# Patient Record
Sex: MALE
[De-identification: names, ages, dates, MRNs, and addresses within clinical notes are randomized per-mention and may not be internally consistent; named-entity substitution may affect disease eponyms.]

---

## 2020-02-04 ENCOUNTER — HOSPITAL ENCOUNTER (OUTPATIENT)
Dept: HOSPITAL 88 - OR | Age: 30
Discharge: HOME | End: 2020-02-04
Attending: SPECIALIST
Payer: COMMERCIAL

## 2020-02-04 VITALS — DIASTOLIC BLOOD PRESSURE: 83 MMHG | SYSTOLIC BLOOD PRESSURE: 135 MMHG

## 2020-02-04 DIAGNOSIS — M23.612: Primary | ICD-10-CM

## 2020-02-04 DIAGNOSIS — S83.282A: ICD-10-CM

## 2020-02-04 DIAGNOSIS — S83.232A: ICD-10-CM
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
## 2020-02-04 DIAGNOSIS — F17.210: ICD-10-CM

## 2020-02-04 DIAGNOSIS — M67.52: ICD-10-CM
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
## 2020-02-04 NOTE — OPERATIVE REPORT
DATE OF PROCEDURE:  02/04/2020

 

SURGEON:  Jerald Mercado MD

 

ASSISTANT:  Husam Mcguire, certified PA.

 

PREOPERATIVE DIAGNOSES:  Left knee anterior cruciate ligament tear, medial meniscal tear.

 

POSTOPERATIVE DIAGNOSES:  Left knee anterior cruciate ligament tear, lateral meniscal

tear. 

 

PROCEDURES:  Left knee arthroscopy, partial lateral meniscectomy, ACL reconstruction.

 

INDICATION:  The patient is a 30-year-old gentleman, who injured his knee playing

soccer.  Clinic exam and MRI findings were consistent with a torn ACL.  He also appears

to have either a torn medial or lateral meniscus.  The findings and options have been

discussed with the patient.  He would like to have surgical reconstruction.  The risks

and benefits have been reviewed.  A lengthy recovery has been discussed.  The patient

states he understands and wishes to proceed. 

 

PROCEDURE IN DETAIL:  The patient was brought to the operating room and placed under

general anesthetic.  He received prophylactic antibiotics and a regional block in the

holding area.  His left lower extremity was prepped and draped in a sterile manner.  A

preoperative time-out was performed.  The extremity had been exsanguinated and a

proximal tourniquet was inflated to 300 mmHg.  Standard arthroscopy portals were

established.  The knee was insufflated with sterile saline and systematically inspected.

 The suprapatellar pouch was unremarkable.  The patellofemoral groove was normal.  The

medial lateral gutters were free of any loose bodies.  There was a small medial

parapatellar plica.  The anterior cruciate ligament was torn.  There was a positive

empty lateral wall sign.  The medial compartment was carefully inspected and probed.

There was no evidence of a medial meniscal tear.  The lateral meniscus revealed a radial

tear of the midportion of the meniscus.  A combination of angled biters and a meniscal

shaver was used to debride the meniscus back to a stable margin.  Before and after

photographs were taken.  The remnant of the anterior cruciate ligament was debrided.

Notchplasty was performed using a bone-cutting shaver.  An extra-articular guide was

then used to place a guide pin into the footprint of the previous ACL.  The positioning

of the pin was also referenced off the posterior border of the anterior horn of the

lateral meniscus.  Thus, an incision had been made in the proximal medial tibia.  A

tibialis anterior allograft was prepared on the back table, this was sized at 10 mm in

diameter.  This was fairly tight with passing to a 10 mm sizer.  The guide pin was

over-reamed with a 10 mm reamer.  A 5 mm over-the-top guide was then used to place a

femoral guide pin in roughly the 3 o'clock position.  This was brought out the

anterolateral cortex of the femur and the soft tissue.  This was over-reamed to 40 mm

using an [place] 10 mm reamer.  The Endobutton drill was then placed over the guide pin.

A Prolene stitch was passed.  A depth gauge was used to determine the total length of

the femoral tunnel to be about 44 mm.  A 20 mm Endobutton was attached to the allograft,

this was passed through the tunnels, it was very tight.  The Endobutton was deployed

over the anterior lateral femoral cortex.  The knee was cycled with tension applied to

the graft.  Because it was so tight, I used a 9 x 20 mm bioabsorbable interference screw

in the tibial tunnel.  The scope was then used to examine the graft.  This was under

appropriate tension and there was no anterior impingement.  The redundant part of the

graft extruding from the tibial tunnel was resected.  The incisions were closed with

subcuticular Vicryl and nylon stitches.  A sterile bandage and a [place] brace were

applied.  The patient was 

extubated and transported to the recovery room in stable condition.  There was no blood

loss and all needle and sponge counts were correct. 

 

 

 

 

______________________________

Jerald Mercado MD DR/CORRINA

D:  02/04/2020 10:20:57

T:  02/04/2020 11:09:48

Job #:  [id number]

## 2020-02-25 ENCOUNTER — HOSPITAL ENCOUNTER (OUTPATIENT)
Dept: HOSPITAL 88 - PT | Age: 30
LOS: 4 days | End: 2020-02-29
Attending: SPECIALIST
Payer: COMMERCIAL

## 2020-02-25 DIAGNOSIS — M62.81: ICD-10-CM

## 2020-02-25 DIAGNOSIS — M25.562: ICD-10-CM

## 2020-02-25 DIAGNOSIS — M25.662: ICD-10-CM

## 2020-02-25 DIAGNOSIS — S83.512D: Primary | ICD-10-CM

## 2020-03-31 ENCOUNTER — HOSPITAL ENCOUNTER (OUTPATIENT)
Dept: HOSPITAL 88 - PT | Age: 30
End: 2020-03-31
Attending: SPECIALIST
Payer: COMMERCIAL

## 2020-03-31 DIAGNOSIS — S83.512A: Primary | ICD-10-CM

## 2020-03-31 PROCEDURE — 97139 UNLISTED THERAPEUTIC PX: CPT

## 2020-04-20 ENCOUNTER — HOSPITAL ENCOUNTER (OUTPATIENT)
Dept: HOSPITAL 88 - PT | Age: 30
LOS: 10 days | End: 2020-04-30
Attending: SPECIALIST
Payer: COMMERCIAL

## 2020-04-20 DIAGNOSIS — S83.512D: Primary | ICD-10-CM

## 2020-04-20 DIAGNOSIS — M25.562: ICD-10-CM

## 2020-04-20 DIAGNOSIS — M25.662: ICD-10-CM

## 2020-04-20 DIAGNOSIS — M62.81: ICD-10-CM

## 2020-04-20 PROCEDURE — 97139 UNLISTED THERAPEUTIC PX: CPT
